# Patient Record
Sex: FEMALE | Race: WHITE | Employment: FULL TIME | URBAN - METROPOLITAN AREA
[De-identification: names, ages, dates, MRNs, and addresses within clinical notes are randomized per-mention and may not be internally consistent; named-entity substitution may affect disease eponyms.]

---

## 2020-03-20 ENCOUNTER — OFFICE VISIT (OUTPATIENT)
Dept: FAMILY MEDICINE CLINIC | Facility: CLINIC | Age: 25
End: 2020-03-20
Payer: COMMERCIAL

## 2020-03-20 VITALS
WEIGHT: 120 LBS | OXYGEN SATURATION: 98 % | HEART RATE: 80 BPM | HEIGHT: 61 IN | SYSTOLIC BLOOD PRESSURE: 118 MMHG | BODY MASS INDEX: 22.66 KG/M2 | DIASTOLIC BLOOD PRESSURE: 70 MMHG

## 2020-03-20 DIAGNOSIS — Z00.00 ENCOUNTER FOR ROUTINE ADULT HEALTH EXAMINATION WITHOUT ABNORMAL FINDINGS: Primary | ICD-10-CM

## 2020-03-20 DIAGNOSIS — Z23 NEED FOR VACCINATION: ICD-10-CM

## 2020-03-20 PROCEDURE — 90471 IMMUNIZATION ADMIN: CPT | Performed by: FAMILY MEDICINE

## 2020-03-20 PROCEDURE — 90651 9VHPV VACCINE 2/3 DOSE IM: CPT | Performed by: FAMILY MEDICINE

## 2020-03-20 PROCEDURE — 90686 IIV4 VACC NO PRSV 0.5 ML IM: CPT | Performed by: FAMILY MEDICINE

## 2020-03-20 PROCEDURE — 90715 TDAP VACCINE 7 YRS/> IM: CPT | Performed by: FAMILY MEDICINE

## 2020-03-20 PROCEDURE — 90472 IMMUNIZATION ADMIN EACH ADD: CPT | Performed by: FAMILY MEDICINE

## 2020-03-20 PROCEDURE — 99385 PREV VISIT NEW AGE 18-39: CPT | Performed by: FAMILY MEDICINE

## 2020-03-20 NOTE — PATIENT INSTRUCTIONS
Prevention Guidelines, Women Ages 25 to 44  Screening tests and vaccines are an important part of managing your health. A screening test is done to find possible disorders or diseases in people who don't have any symptoms.  The goal is to find a disease e Type 2 diabetes, prediabetes All women diagnosed with gestational diabetes Lifelong testing every 3 years   Type 2 diabetes All women with prediabetes Every year   Gonorrhea Sexually active women at increased risk for infection At routine exams   Hepatitis Measles, mumps, rubella (MMR) All women in this age group who have no record of these infections or vaccines 1 or 2 doses   Meningococcal Women at increased risk for infection should talk with their healthcare provider 1 or more doses   Pneumococcal conjug © 3998-3553 [employer] [address]. [address] [facility], [facility] [place]. All rights reserved. This information is not intended as a substitute for professional medical care. Always follow your healthcare professional's instructions.

## 2020-03-20 NOTE — PROGRESS NOTES
HPI:   Trenton Manning is a 22year old female who presents for a complete physical exam.   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 : No. Children: None.           EXAM -Cholesterol screening-not indicated   -Recommendation for yearly influenza vaccine  -Need for HPV vaccination series-given #2/3  -Need for Tdap once as an adult and Td booster every 10 years  -Contraception: Condoms   -STI screening (GC/Chlamydia/HIV):

## 2020-12-04 ENCOUNTER — TELEMEDICINE (OUTPATIENT)
Dept: FAMILY MEDICINE CLINIC | Facility: CLINIC | Age: 25
End: 2020-12-04

## 2020-12-04 DIAGNOSIS — Z15.01 BRCA POSITIVE: ICD-10-CM

## 2020-12-04 DIAGNOSIS — Z15.09 BRCA POSITIVE: ICD-10-CM

## 2020-12-04 DIAGNOSIS — Z30.011 ENCOUNTER FOR INITIAL PRESCRIPTION OF CONTRACEPTIVE PILLS: Primary | ICD-10-CM

## 2020-12-04 PROCEDURE — 99214 OFFICE O/P EST MOD 30 MIN: CPT | Performed by: FAMILY MEDICINE

## 2020-12-04 RX ORDER — NORGESTIMATE AND ETHINYL ESTRADIOL 7DAYSX3 LO
1 KIT ORAL DAILY
Qty: 3 PACKAGE | Refills: 3 | Status: SHIPPED | OUTPATIENT
Start: 2020-12-04 | End: 2020-12-04

## 2020-12-04 RX ORDER — NORGESTIMATE AND ETHINYL ESTRADIOL 7DAYSX3 LO
1 KIT ORAL DAILY
Qty: 3 PACKAGE | Refills: 3 | Status: SHIPPED | OUTPATIENT
Start: 2020-12-04 | End: 2021-10-25

## 2020-12-04 NOTE — PROGRESS NOTES
Samantha Ortiz is a 26year old female presenting for video visit for birth control consult. HPI:   Has been struggling with hormonal imbalances and acne over the last few years.  Wants a pill that does not make this worse and hopefully improves well nourished, in no apparent distress   SKIN: no rashes or visible acne  LUNGS: speaking in clear and full sentences  Psych: normal mood and affect     ASSESSMENT AND PLAN:     Josué Amador was seen today for contraception.     Diagnoses and all orders for [facility] tests and decision making. Appropriate medical decision-making and tests are ordered as detailed in the plan of care above.

## 2020-12-04 NOTE — PATIENT INSTRUCTIONS
Birth Control: The Pill    Birth control pills contain hormones that help prevent pregnancy. The pills are prescribed by your healthcare provider. There are many types of birth control pills available.  If you have side effects from one type of pill, tell In these cases, discuss the risks with your healthcare provider. Karen last reviewed this educational content on 4/1/2020  © 7666-4916 [employer] [address]. [address] [facility] [place], [address] [place] [address]. All rights reserved.  This information is not in

## 2021-08-02 ENCOUNTER — TELEPHONE (OUTPATIENT)
Dept: FAMILY MEDICINE CLINIC | Facility: CLINIC | Age: 26
End: 2021-08-02

## 2021-08-02 ENCOUNTER — TELEMEDICINE (OUTPATIENT)
Dept: FAMILY MEDICINE CLINIC | Facility: CLINIC | Age: 26
End: 2021-08-02
Payer: COMMERCIAL

## 2021-08-02 VITALS — OXYGEN SATURATION: 97 % | HEART RATE: 98 BPM

## 2021-08-02 DIAGNOSIS — U07.1 TELEHEALTH ENCOUNTER FOR CONFIRMED COVID-19: Primary | ICD-10-CM

## 2021-08-02 LAB — AMB EXT COVID-19 RESULT: DETECTED

## 2021-08-02 PROCEDURE — 99213 OFFICE O/P EST LOW 20 MIN: CPT | Performed by: FAMILY MEDICINE

## 2021-08-02 NOTE — TELEPHONE ENCOUNTER
Please offer to add on for video visit at 4:30 today but let her know she does not qualify for polyclonal antibody therapy as she does not meet the criteria for underlying health conditions.

## 2021-08-02 NOTE — PROGRESS NOTES
CC:  Patient presents with: Follow - Up: covid +      HPI: 26year old female presenting for video visit to [facility] positive Covid testing. Was tested for Covid at an immediate care center this morning and it came back positive.  Did have a chest x-ray due Food Insecurity:       Worried About [address] in the Last Year:       Ran Out of Food in the Last Year:   Transportation Needs:       Lack of Transportation (Medical):       Lack of Transportation (Non-Medical):   Physical Activity:       Days following visit was completed using two-way, real-time interactive audio and video communication.  This has been done in good nadya to provide continuity of care in the best interest of the provider-patient relationship, due to the ongoing public health cri

## 2021-08-02 NOTE — TELEPHONE ENCOUNTER
Patient states she is positive with covid , would like to discuss. Currently has a fever, chills, bodyaches, headache.

## 2021-08-02 NOTE — TELEPHONE ENCOUNTER
Pt reports positive covid today. Pt's fiance has covid. Pt experiencing body aches, headache, fever of 100.5 and fatigue. Pt reports alternating between tylenol and motrin every 4 hours.      RN encouraged wearing a mask, frequent hand washing for 20 se

## 2021-08-02 NOTE — PATIENT INSTRUCTIONS
Coronavirus Disease 2019 (COVID-19)     [facility] is committed to the safety and well-being of our patients, members, employees, and communities.  As concerns arise about the new strain of coronavirus that causes COVID-19, [facility] exposure  • After day 7 from date of last exposure with a negative test result (test must occur on day 5 or later)  After stopping quarantine, you should  • Watch for symptoms until 14 days after exposure.   • If you have symptoms, immediately self-isolate Care     If you are awaiting test results or are confirmed positive for COVID -19, and your symptoms worsen at home with symptoms such as: extreme weakness, difficult breathing, or unrelenting fevers greater than 100.4 degrees Fahrenheit, you should contac Follow-up  If you are diagnosed with COVID, refrain from exercise until approved by your primary care provider. Please call your primary care provider within 2 days of your discharge to arrange for a telehealth follow-up.  CDC does not recommend repeat test Control & Prevention (CDC)  10 things you can do to manage your health at home, Eladia.nl. pdf  [employer] Retrieved March 17, 2021, from https://health.[facility]/coronavirus/covid-19-information/covid-19-long-haulers. html  Long-term effects of covid-19. (n.d.).  Retrieved May 11, 2021, from [employer]

## 2021-10-25 RX ORDER — NORGESTIMATE AND ETHINYL ESTRADIOL 7DAYSX3 LO
1 KIT ORAL DAILY
Qty: 84 TABLET | Refills: 0 | Status: SHIPPED | OUTPATIENT
Start: 2021-10-25 | End: 2022-01-03

## 2022-01-03 ENCOUNTER — TELEPHONE (OUTPATIENT)
Dept: FAMILY MEDICINE CLINIC | Facility: CLINIC | Age: 27
End: 2022-01-03

## 2022-01-03 RX ORDER — NORGESTIMATE AND ETHINYL ESTRADIOL 7DAYSX3 LO
1 KIT ORAL DAILY
Qty: 84 TABLET | Refills: 0 | Status: SHIPPED | OUTPATIENT
Start: 2022-01-03 | End: 2022-02-01 | Stop reason: ALTCHOICE

## 2022-01-03 NOTE — TELEPHONE ENCOUNTER
LVMTCB regarding message received from answering service 1/1/22. Needs to clarify if patient is seeking appt in person, or telehealth. Pt must be within state of [place]. Per message, pt was/is out of state and suffered a panic attack.  Needs to be seen righ

## 2022-01-03 NOTE — TELEPHONE ENCOUNTER
A refill request was received for:  Requested Prescriptions     Pending Prescriptions Disp Refills   • Norgestim-Eth Estrad Triphasic (TRI-LO-GLADYS) 0.18/0.215/0.25 MG-25 MCG Oral Tab 84 tablet 0     Sig: Take 1 tablet by mouth daily.      Last refill date:

## 2022-01-03 NOTE — TELEPHONE ENCOUNTER
Called pt and c/o anxiety/panic attacks. Pt wants to be able to deal with the panic attacks (never had this concern until now). Provided video visit for tomorrow (per [place] in [place]). Also provided physical exam visit for 02/01/21. Pt agrees to both.

## 2022-01-04 PROBLEM — U07.1 COVID-19: Status: RESOLVED | Noted: 2021-08-02 | Resolved: 2022-01-04

## 2022-01-04 PROBLEM — F41.0 SEVERE ANXIETY WITH PANIC: Status: ACTIVE | Noted: 2022-01-04

## 2022-01-04 NOTE — PROGRESS NOTES
CC:  Patient presents with: Anxiety      HPI: 23year old female presenting for video visit to discuss anxiety with panic attacks. She has never been diagnosed with depression or anxiety before but does report having a history of stress.   [facility]     Social History    Socioeconomic History      Marital status: Single      Spouse name: Not on file      Number of children: Not on file      Years of education: Not on file      Highest education level: Not on file    Occupational History      N Patient Health Questionnaire (PRIME-MD PHQ). The PHQ was developed by Daren Reyes, Ella Friedman and colleagues. For research information, contact Dr. Azalia Armenta at [email or web address].  PRIME-MD® is a trademark of [employer]. [employer] provider-patient relationship, due to the ongoing public health crisis/national emergency and because of restrictions of visitation. There are limitations of this visit as no physical exam could be performed.  Every conscious effort was taken to allow for s

## 2022-01-26 ENCOUNTER — OFFICE VISIT (OUTPATIENT)
Dept: NEUROLOGY | Facility: CLINIC | Age: 27
End: 2022-01-26
Payer: COMMERCIAL

## 2022-01-26 VITALS
WEIGHT: 104 LBS | SYSTOLIC BLOOD PRESSURE: 105 MMHG | DIASTOLIC BLOOD PRESSURE: 63 MMHG | HEART RATE: 103 BPM | RESPIRATION RATE: 16 BRPM | BODY MASS INDEX: 20 KG/M2

## 2022-01-26 DIAGNOSIS — R42 DIZZINESS: Primary | ICD-10-CM

## 2022-01-26 DIAGNOSIS — R41.840 POOR CONCENTRATION: ICD-10-CM

## 2022-01-26 DIAGNOSIS — F41.0 SEVERE ANXIETY WITH PANIC: ICD-10-CM

## 2022-01-26 DIAGNOSIS — G44.209 TENSION HEADACHE: ICD-10-CM

## 2022-01-26 PROCEDURE — 99204 OFFICE O/P NEW MOD 45 MIN: CPT | Performed by: OTHER

## 2022-01-26 PROCEDURE — 3074F SYST BP LT 130 MM HG: CPT | Performed by: OTHER

## 2022-01-26 PROCEDURE — 3078F DIAST BP <80 MM HG: CPT | Performed by: OTHER

## 2022-01-26 RX ORDER — MECLIZINE HYDROCHLORIDE 25 MG/1
25 TABLET ORAL 3 TIMES DAILY PRN
Qty: 30 TABLET | Refills: 0 | Status: SHIPPED | OUTPATIENT
Start: 2022-01-26

## 2022-01-26 NOTE — PATIENT INSTRUCTIONS
After your visit at the [facility] office today,  please direct any follow up questions or medication needs to the staff in our [place] office so that your concerns may be promptly addressed.   We are available through [employer] or at the numbers below: picked up in office. • Please allow the office 2-3 business days to fill the prescription. • Patient must present photo ID at time of . PLEASE NOTE: PRESCRIPTIONS MUST BE PICKED UP PRIOR TO 3:00PM MONDAY-FRIDAY    Scheduling Tests:     If your ph submitting forms to office staff. • Form completion may require an additional fee. • A signed Release of Information (CACHORRO) must be on file before forms may be submitted. When dropping off forms, please ask the  for this paper.    • Failure

## 2022-01-26 NOTE — PROGRESS NOTES
Neurology H&P    Guillermo Paul [address] Patient Status:  No patient class for patient encounter    8/3/1995 MRN [id number]   Location [address]  Attending No att. providers found   Hosp Day # 0 PCP Adriel Guallpa or tingling. She states that she has no relieving factors. She reports that she has lost 10 pounds and is not eating or drinking much. She has a FH of depression no FH or personal h/o seizure.   She states that she does not know what brings her symptoms on Never used    Alcohol use: Not Currently    Drug use: Not Currently      Comment: Patient reports a negative reaction to an \"edible\"      Family History:  Family History   Problem Relation Age of Onset   • Breast Cancer Maternal Aunt    • BRCA gene + Mat CNS imaging to review    Assessment: This is a 24 y/o female with episodes of nausea and headaches and panic attacks. She feels thas these got much worse after using a virtual reality headset for a game about a month ago.  She has had a few other shorter e

## 2022-01-26 NOTE — PROGRESS NOTES
Patient reports dizziness everyday that comes in waves, but can be present all day. Patient reports constant nausea.

## 2022-02-01 ENCOUNTER — OFFICE VISIT (OUTPATIENT)
Dept: FAMILY MEDICINE CLINIC | Facility: CLINIC | Age: 27
End: 2022-02-01
Payer: COMMERCIAL

## 2022-02-01 VITALS
DIASTOLIC BLOOD PRESSURE: 70 MMHG | HEART RATE: 84 BPM | SYSTOLIC BLOOD PRESSURE: 110 MMHG | BODY MASS INDEX: 19.83 KG/M2 | OXYGEN SATURATION: 98 % | HEIGHT: 61 IN | WEIGHT: 105 LBS

## 2022-02-01 DIAGNOSIS — F32.1 CURRENT MODERATE EPISODE OF MAJOR DEPRESSIVE DISORDER WITHOUT PRIOR EPISODE (HCC): ICD-10-CM

## 2022-02-01 DIAGNOSIS — Z12.4 PAP SMEAR FOR CERVICAL CANCER SCREENING: ICD-10-CM

## 2022-02-01 DIAGNOSIS — Z80.3 FAMILY HISTORY OF BREAST CANCER: ICD-10-CM

## 2022-02-01 DIAGNOSIS — Z00.00 ENCOUNTER FOR ROUTINE ADULT HEALTH EXAMINATION WITHOUT ABNORMAL FINDINGS: Primary | ICD-10-CM

## 2022-02-01 DIAGNOSIS — F41.9 ANXIETY: ICD-10-CM

## 2022-02-01 DIAGNOSIS — R53.83 FATIGUE, UNSPECIFIED TYPE: ICD-10-CM

## 2022-02-01 PROBLEM — F41.0 SEVERE ANXIETY WITH PANIC: Status: RESOLVED | Noted: 2022-01-04 | Resolved: 2022-02-01

## 2022-02-01 PROBLEM — R42 DIZZINESS: Status: RESOLVED | Noted: 2022-01-26 | Resolved: 2022-02-01

## 2022-02-01 PROCEDURE — 88175 CYTOPATH C/V AUTO FLUID REDO: CPT | Performed by: FAMILY MEDICINE

## 2022-02-01 PROCEDURE — 3078F DIAST BP <80 MM HG: CPT | Performed by: FAMILY MEDICINE

## 2022-02-01 PROCEDURE — 3008F BODY MASS INDEX DOCD: CPT | Performed by: FAMILY MEDICINE

## 2022-02-01 PROCEDURE — 3074F SYST BP LT 130 MM HG: CPT | Performed by: FAMILY MEDICINE

## 2022-02-01 PROCEDURE — 99395 PREV VISIT EST AGE 18-39: CPT | Performed by: FAMILY MEDICINE

## 2022-02-01 RX ORDER — ESCITALOPRAM OXALATE 5 MG/1
5 TABLET ORAL DAILY
Qty: 60 TABLET | Refills: 0 | Status: SHIPPED | OUTPATIENT
Start: 2022-02-01 | End: 2022-03-07

## 2022-02-12 LAB
LAST PAP RESULT: NORMAL
PAP HISTORY (OTHER THAN LAST PAP): NORMAL

## 2022-02-16 ENCOUNTER — LAB ENCOUNTER (OUTPATIENT)
Dept: LAB | Age: 27
End: 2022-02-16
Attending: FAMILY MEDICINE
Payer: COMMERCIAL

## 2022-02-16 DIAGNOSIS — R53.83 FATIGUE, UNSPECIFIED TYPE: ICD-10-CM

## 2022-02-16 DIAGNOSIS — Z00.00 ENCOUNTER FOR ROUTINE ADULT HEALTH EXAMINATION WITHOUT ABNORMAL FINDINGS: ICD-10-CM

## 2022-02-16 DIAGNOSIS — F41.9 ANXIETY: ICD-10-CM

## 2022-02-16 LAB
ALBUMIN SERPL-MCNC: 3.7 G/DL (ref 3.4–5)
ALBUMIN/GLOB SERPL: 1 {RATIO} (ref 1–2)
ALP LIVER SERPL-CCNC: 59 U/L
ALT SERPL-CCNC: 23 U/L
ANION GAP SERPL CALC-SCNC: 8 MMOL/L (ref 0–18)
AST SERPL-CCNC: 13 U/L (ref 15–37)
BASOPHILS # BLD AUTO: 0.02 X10(3) UL (ref 0–0.2)
BASOPHILS NFR BLD AUTO: 0.4 %
BILIRUB SERPL-MCNC: 0.6 MG/DL (ref 0.1–2)
BUN BLD-MCNC: 17 MG/DL (ref 7–18)
BUN/CREAT SERPL: 25 (ref 10–20)
CALCIUM BLD-MCNC: 9.1 MG/DL (ref 8.5–10.1)
CHLORIDE SERPL-SCNC: 106 MMOL/L (ref 98–112)
CHOLEST SERPL-MCNC: 123 MG/DL (ref ?–200)
CO2 SERPL-SCNC: 27 MMOL/L (ref 21–32)
CREAT BLD-MCNC: 0.68 MG/DL
EOSINOPHIL # BLD AUTO: 0.04 X10(3) UL (ref 0–0.7)
EOSINOPHIL NFR BLD AUTO: 0.7 %
ERYTHROCYTE [DISTWIDTH] IN BLOOD BY AUTOMATED COUNT: 11.8 % (ref 11–15)
EST. AVERAGE GLUCOSE BLD GHB EST-MCNC: 91 MG/DL (ref 68–126)
FASTING PATIENT LIPID ANSWER: NO
FASTING STATUS PATIENT QL REPORTED: NO
GLOBULIN PLAS-MCNC: 3.6 G/DL (ref 2.8–4.4)
GLUCOSE BLD-MCNC: 111 MG/DL (ref 70–99)
HBA1C MFR BLD: 4.8 % (ref ?–5.7)
HCT VFR BLD AUTO: 43.3 %
HCV AB SERPL QL IA: NONREACTIVE
HDLC SERPL-MCNC: 46 MG/DL (ref 40–59)
HGB BLD-MCNC: 14.8 G/DL
IMM GRANULOCYTES # BLD AUTO: 0.01 X10(3) UL (ref 0–1)
IMM GRANULOCYTES NFR BLD: 0.2 %
LDLC SERPL CALC-MCNC: 60 MG/DL (ref ?–100)
LYMPHOCYTES # BLD AUTO: 1.9 X10(3) UL (ref 1–4)
LYMPHOCYTES NFR BLD AUTO: 33.8 %
MCH RBC QN AUTO: 32.2 PG (ref 26–34)
MCHC RBC AUTO-ENTMCNC: 34.2 G/DL (ref 31–37)
MCV RBC AUTO: 94.1 FL
MONOCYTES # BLD AUTO: 0.65 X10(3) UL (ref 0.1–1)
MONOCYTES NFR BLD AUTO: 11.6 %
NEUTROPHILS # BLD AUTO: 3 X10 (3) UL (ref 1.5–7.7)
NEUTROPHILS # BLD AUTO: 3 X10(3) UL (ref 1.5–7.7)
NEUTROPHILS NFR BLD AUTO: 53.3 %
NONHDLC SERPL-MCNC: 77 MG/DL (ref ?–130)
OSMOLALITY SERPL CALC.SUM OF ELEC: 294 MOSM/KG (ref 275–295)
PLATELET # BLD AUTO: 195 10(3)UL (ref 150–450)
POTASSIUM SERPL-SCNC: 3.8 MMOL/L (ref 3.5–5.1)
PROT SERPL-MCNC: 7.3 G/DL (ref 6.4–8.2)
RBC # BLD AUTO: 4.6 X10(6)UL
SODIUM SERPL-SCNC: 141 MMOL/L (ref 136–145)
TRIGL SERPL-MCNC: 85 MG/DL (ref 30–149)
TSI SER-ACNC: 0.52 MIU/ML (ref 0.36–3.74)
VIT B12 SERPL-MCNC: 444 PG/ML (ref 193–986)
VIT D+METAB SERPL-MCNC: 14.8 NG/ML (ref 30–100)
VLDLC SERPL CALC-MCNC: 13 MG/DL (ref 0–30)
WBC # BLD AUTO: 5.6 X10(3) UL (ref 4–11)

## 2022-02-16 PROCEDURE — 80053 COMPREHEN METABOLIC PANEL: CPT

## 2022-02-16 PROCEDURE — 80061 LIPID PANEL: CPT

## 2022-02-16 PROCEDURE — 82306 VITAMIN D 25 HYDROXY: CPT | Performed by: FAMILY MEDICINE

## 2022-02-16 PROCEDURE — 85025 COMPLETE CBC W/AUTO DIFF WBC: CPT

## 2022-02-16 PROCEDURE — 82607 VITAMIN B-12: CPT

## 2022-02-16 PROCEDURE — 83036 HEMOGLOBIN GLYCOSYLATED A1C: CPT

## 2022-02-16 PROCEDURE — 84443 ASSAY THYROID STIM HORMONE: CPT

## 2022-02-16 PROCEDURE — 86803 HEPATITIS C AB TEST: CPT

## 2022-02-16 PROCEDURE — 36415 COLL VENOUS BLD VENIPUNCTURE: CPT

## 2022-03-03 RX ORDER — ESCITALOPRAM OXALATE 5 MG/1
5 TABLET ORAL DAILY
Qty: 60 TABLET | Refills: 0 | OUTPATIENT
Start: 2022-03-03

## 2022-06-04 RX ORDER — ESCITALOPRAM OXALATE 5 MG/1
5 TABLET ORAL DAILY
Qty: 90 TABLET | Refills: 1 | Status: SHIPPED | OUTPATIENT
Start: 2022-06-04

## 2022-06-04 NOTE — TELEPHONE ENCOUNTER
Refill passed per [place] [facility], [facility] protocol.   Requested Prescriptions   Pending Prescriptions Disp Refills    ESCITALOPRAM 5 MG Oral Tab [Pharmacy Med Name: Escitalopram Oxalate 5 Mg Tab Solc] 90 tablet 0     Sig: TAKE ONE TABLET BY MOUTH ONE TIME DAILY        Psychiatric Non-Scheduled (Anti-Anxiety) Passed - 6/4/2022  1:31 AM        Passed - Appointment in last 6 or next 3 months            Recent Outpatient Visits              4 months ago Encounter for routine adult health examination without abnormal findings    [address], [place]    Office Visit    4 months ago [address] [facility] P[facility] [place]-Neurology  [facility]    Office Visit    5 months ago Severe anxiety with panic    [address] [facility], [address] [place] [address],     Telemedicine    10 months ago Telehealth encounter for confirmed [address], [address], DO    Telemedicine    1 year ago Encounter for initial prescription of contraceptive pills    [address], [facility], [address] [place] [address]

## 2022-07-25 RX ORDER — ESCITALOPRAM OXALATE 5 MG/5ML
SOLUTION ORAL
Qty: 180 ML | Refills: 0 | Status: SHIPPED | OUTPATIENT
Start: 2022-07-25 | End: 2022-09-12

## 2022-08-19 RX ORDER — ESCITALOPRAM OXALATE 5 MG/5ML
SOLUTION ORAL
Qty: 180 ML | Refills: 1 | Status: SHIPPED | OUTPATIENT
Start: 2022-08-19 | End: 2022-10-07

## 2022-09-12 RX ORDER — ESCITALOPRAM OXALATE 5 MG/5ML
SOLUTION ORAL
Qty: 180 ML | Refills: 1 | Status: SHIPPED | OUTPATIENT
Start: 2022-09-12 | End: 2022-10-24

## 2022-10-10 RX ORDER — ESCITALOPRAM OXALATE 5 MG/5ML
2.6 SOLUTION ORAL DAILY
Qty: 234 ML | Refills: 1 | Status: SHIPPED | OUTPATIENT
Start: 2022-10-10

## 2022-10-10 NOTE — TELEPHONE ENCOUNTER
Please review. Protocol failed / No Protocol. Requested Prescriptions   Pending Prescriptions Disp Refills    escitalopram 5 MG/5ML Oral Solution 180 mL 1     Sig: Take 3 mL (3 mg total) by mouth daily for 7 days, THEN 2.5 mL (2.5 mg total) daily for 7 days, THEN 2 mL (2 mg total) daily for 7 days, THEN 1.5 mL (1.5 mg total) daily for 7 days, THEN 1 mL (1 mg total) daily for 7 days, THEN 0.5 mL (0.5 mg total) daily for 7 days.         Psychiatric Non-Scheduled (Anti-Anxiety) Failed - 10/7/2022  2:37 PM        Failed - In person appointment or virtual visit in the past 6 mos or appointment in next 3 mos       Recent Outpatient Visits              8 months ago Encounter for routine adult health examination without abnormal findings    [address] [facility], [address] [place] [address]    Office Visit    8 months ago [address] [facility] [place] P[facility] [place]-Neurology  Andrew Baltazar, DO    Office Visit    9 months ago Severe anxiety with panic    [address] [facility], [address] [place] [address], [address] [facility] [address]    1 year ago Telehealth encounter for confirmed [address], [address] [place] [address]    Telemedicine    1 year ago Encounter for initial prescription of contraceptive pills    [address] [facility], [address] [place] [address], [address]  Visits              8 months ago Encounter for routine adult health examination without abnormal findings    [address], [place]    Office Visit    8 months ago [facility] [place]  Andrew Baltazar,     Office Visit    9 months ago Severe anxiety with panic    [address] [facility], [address] [place] [address]    Telemedicine    1 year ago Telehealth encounter for confirmed [address], [address], [place]    Telemedicine    1 year ago Encounter for initial prescription of contraceptive pills    [address] [facility], [facility], [address] [facility]